# Patient Record
Sex: FEMALE | Race: WHITE | URBAN - METROPOLITAN AREA
[De-identification: names, ages, dates, MRNs, and addresses within clinical notes are randomized per-mention and may not be internally consistent; named-entity substitution may affect disease eponyms.]

---

## 2022-02-10 ENCOUNTER — APPOINTMENT (RX ONLY)
Dept: URBAN - METROPOLITAN AREA OTHER 13 | Facility: OTHER | Age: 67
Setting detail: DERMATOLOGY
End: 2022-02-10

## 2022-02-10 DIAGNOSIS — L98.8 OTHER SPECIFIED DISORDERS OF THE SKIN AND SUBCUTANEOUS TISSUE: ICD-10-CM

## 2022-02-10 PROCEDURE — ? COUNSELING

## 2022-02-10 PROCEDURE — ? EDUCATIONAL RESOURCES PROVIDED

## 2022-02-10 PROCEDURE — ? OTHER

## 2022-02-10 PROCEDURE — ? BOTOX

## 2022-02-10 PROCEDURE — ? MEDICAL CONSULTATION: DYNAMIC RHYTIDES

## 2022-02-10 NOTE — PROCEDURE: OTHER
Render Risk Assessment In Note?: yes
Other (Free Text): Discussed facial anatomy, moa with Botox, use of retinol,spf daily, samples given with coupons. Discussed holding off on frontalis , recheck formula in 2 weeks to decide if need to add more /adjust
Note Text (......Xxx Chief Complaint.): This diagnosis correlates with the
Detail Level: Zone

## 2022-02-10 NOTE — PROCEDURE: BOTOX
Price (Use Numbers Only, No Special Characters Or $): 412.82
Forehead Units: 0
Dilution (U/0.1 Cc): 1
Show Levator Superior Units: Yes
Show Mentalis Units: No
Reconstitution Date (Optional): 02/10/2022
Additional Area 1 Location: brow
Post-Care Instructions: Patient instructed to not lie down for 4 hours and limit physical activity for 24 hours. Patient instructed not to travel by airplane for 48 hours.
Consent: Written consent obtained. Risks include but not limited to lid/brow ptosis, bruising, swelling, diplopia, temporary effect, incomplete chemical denervation.
Lot #: [id number]
Expiration Date (Month Year): 04/2024
Detail Level: Detailed

## 2022-02-24 ENCOUNTER — APPOINTMENT (RX ONLY)
Dept: URBAN - METROPOLITAN AREA OTHER 13 | Facility: OTHER | Age: 67
Setting detail: DERMATOLOGY
End: 2022-02-24

## 2022-02-24 DIAGNOSIS — L98.8 OTHER SPECIFIED DISORDERS OF THE SKIN AND SUBCUTANEOUS TISSUE: ICD-10-CM

## 2022-02-24 PROCEDURE — ? BOTOX

## 2022-02-24 PROCEDURE — ? MEDICAL CONSULTATION: DYNAMIC RHYTIDES

## 2022-02-24 PROCEDURE — ? COUNSELING

## 2022-02-24 NOTE — PROCEDURE: BOTOX
Show Lateral Platysmal Band Units: Yes
Right Periorbital Skin Units: 0
Expiration Date (Month Year): 04/2024
Show Mentalis Units: No
Dilution (U/0.1 Cc): 1
Price (Use Numbers Only, No Special Characters Or $): 25
Additional Area 1 Location: brow
Glabellar Complex Units: 2
Reconstitution Date (Optional): 2/17/2022
Consent: Written consent obtained. Risks include but not limited to lid/brow ptosis, bruising, swelling, diplopia, temporary effect, incomplete chemical denervation.
Detail Level: Detailed
Post-Care Instructions: Patient instructed to not lie down for 4 hours and limit physical activity for 24 hours. Patient instructed not to travel by airplane for 48 hours.
Lot #: [id number]

## 2022-07-07 ENCOUNTER — APPOINTMENT (RX ONLY)
Dept: URBAN - METROPOLITAN AREA OTHER 13 | Facility: OTHER | Age: 67
Setting detail: DERMATOLOGY
End: 2022-07-07

## 2022-07-07 DIAGNOSIS — L98.8 OTHER SPECIFIED DISORDERS OF THE SKIN AND SUBCUTANEOUS TISSUE: ICD-10-CM

## 2022-07-07 DIAGNOSIS — Z71.89 OTHER SPECIFIED COUNSELING: ICD-10-CM

## 2022-07-07 PROCEDURE — ? COUNSELING

## 2022-07-07 PROCEDURE — ? BOTOX

## 2022-07-07 PROCEDURE — ? OTHER

## 2022-07-07 PROCEDURE — ? MEDICAL CONSULTATION: DYNAMIC RHYTIDES

## 2022-07-07 NOTE — PROCEDURE: OTHER
Render Risk Assessment In Note?: yes
Note Text (......Xxx Chief Complaint.): This diagnosis correlates with the
Detail Level: Zone
Other (Free Text): Neutrogena rapid wrinkle

## 2022-07-07 NOTE — PROCEDURE: BOTOX
Show Lateral Platysmal Band Units: Yes
Right Periorbital Skin Units: 0
Additional Area 2 Units: 4
Expiration Date (Month Year): 10/2023
Show Mentalis Units: No
Additional Area 3 Location: lower lip
Dilution (U/0.1 Cc): 1
Price (Use Numbers Only, No Special Characters Or $): 437.40
Additional Area 3 Units: 2
Additional Area 1 Location: brow
Glabellar Complex Units: 20
Reconstitution Date (Optional): 07/07/2022
Consent: Written consent obtained. Risks include but not limited to lid/brow ptosis, bruising, swelling, diplopia, temporary effect, incomplete chemical denervation.
Detail Level: Detailed
Additional Area 2 Location: upper lip
Periorbital Skin Units: 9
Post-Care Instructions: Patient instructed to not lie down for 4 hours and limit physical activity for 24 hours. Patient instructed not to travel by airplane for 48 hours.
Lot #: [id number]